# Patient Record
Sex: MALE | Race: WHITE | NOT HISPANIC OR LATINO | Employment: FULL TIME | URBAN - METROPOLITAN AREA
[De-identification: names, ages, dates, MRNs, and addresses within clinical notes are randomized per-mention and may not be internally consistent; named-entity substitution may affect disease eponyms.]

---

## 2023-08-04 ENCOUNTER — LAB REQUISITION (OUTPATIENT)
Dept: LAB | Facility: CLINIC | Age: 68
End: 2023-08-04
Payer: COMMERCIAL

## 2023-08-04 DIAGNOSIS — Z01.818 ENCOUNTER FOR OTHER PREPROCEDURAL EXAMINATION: ICD-10-CM

## 2023-08-04 LAB
ALBUMIN SERPL BCG-MCNC: 4.3 G/DL (ref 3.5–5.2)
ALP SERPL-CCNC: 100 U/L (ref 40–129)
ALT SERPL W P-5'-P-CCNC: 13 U/L (ref 0–70)
ANION GAP SERPL CALCULATED.3IONS-SCNC: 13 MMOL/L (ref 7–15)
AST SERPL W P-5'-P-CCNC: 15 U/L (ref 0–45)
BILIRUB SERPL-MCNC: 0.8 MG/DL
BUN SERPL-MCNC: 28.4 MG/DL (ref 8–23)
CALCIUM SERPL-MCNC: 9.3 MG/DL (ref 8.8–10.2)
CHLORIDE SERPL-SCNC: 101 MMOL/L (ref 98–107)
CREAT SERPL-MCNC: 1.32 MG/DL (ref 0.67–1.17)
DEPRECATED HCO3 PLAS-SCNC: 22 MMOL/L (ref 22–29)
GFR SERPL CREATININE-BSD FRML MDRD: 59 ML/MIN/1.73M2
GLUCOSE SERPL-MCNC: 161 MG/DL (ref 70–99)
POTASSIUM SERPL-SCNC: 4.6 MMOL/L (ref 3.4–5.3)
PROT SERPL-MCNC: 6.6 G/DL (ref 6.4–8.3)
SODIUM SERPL-SCNC: 136 MMOL/L (ref 136–145)

## 2023-08-04 PROCEDURE — 80053 COMPREHEN METABOLIC PANEL: CPT | Mod: ORL | Performed by: NURSE PRACTITIONER

## 2023-09-14 ENCOUNTER — ANCILLARY ORDERS (OUTPATIENT)
Dept: MRI IMAGING | Facility: HOSPITAL | Age: 68
End: 2023-09-14

## 2023-09-14 ENCOUNTER — LAB REQUISITION (OUTPATIENT)
Dept: LAB | Facility: CLINIC | Age: 68
End: 2023-09-14
Payer: COMMERCIAL

## 2023-09-14 DIAGNOSIS — E78.5 HYPERLIPIDEMIA, UNSPECIFIED: ICD-10-CM

## 2023-09-14 DIAGNOSIS — Z12.5 ENCOUNTER FOR SCREENING FOR MALIGNANT NEOPLASM OF PROSTATE: ICD-10-CM

## 2023-09-14 DIAGNOSIS — N28.9 LESION OF RIGHT NATIVE KIDNEY: ICD-10-CM

## 2023-09-14 LAB
CHOLEST SERPL-MCNC: 189 MG/DL
HDLC SERPL-MCNC: 31 MG/DL
LDLC SERPL CALC-MCNC: 91 MG/DL
NONHDLC SERPL-MCNC: 158 MG/DL
PSA SERPL DL<=0.01 NG/ML-MCNC: 2.93 NG/ML (ref 0–4.5)
TRIGL SERPL-MCNC: 335 MG/DL

## 2023-09-14 PROCEDURE — 80061 LIPID PANEL: CPT | Mod: ORL | Performed by: NURSE PRACTITIONER

## 2023-09-14 PROCEDURE — G0103 PSA SCREENING: HCPCS | Mod: ORL | Performed by: NURSE PRACTITIONER

## 2024-01-09 ENCOUNTER — MEDICAL CORRESPONDENCE (OUTPATIENT)
Dept: HEALTH INFORMATION MANAGEMENT | Facility: CLINIC | Age: 69
End: 2024-01-09
Payer: COMMERCIAL

## 2024-01-09 ENCOUNTER — LAB REQUISITION (OUTPATIENT)
Dept: LAB | Facility: CLINIC | Age: 69
End: 2024-01-09
Payer: COMMERCIAL

## 2024-01-09 DIAGNOSIS — I10 ESSENTIAL (PRIMARY) HYPERTENSION: ICD-10-CM

## 2024-01-09 DIAGNOSIS — E11.9 TYPE 2 DIABETES MELLITUS WITHOUT COMPLICATIONS (H): ICD-10-CM

## 2024-01-09 PROCEDURE — 82570 ASSAY OF URINE CREATININE: CPT | Mod: ORL | Performed by: NURSE PRACTITIONER

## 2024-01-09 PROCEDURE — 80048 BASIC METABOLIC PNL TOTAL CA: CPT | Mod: ORL | Performed by: NURSE PRACTITIONER

## 2024-01-10 ENCOUNTER — TRANSCRIBE ORDERS (OUTPATIENT)
Dept: OTHER | Age: 69
End: 2024-01-10

## 2024-01-10 DIAGNOSIS — I10 HYPERTENSION, UNSPECIFIED TYPE: Primary | ICD-10-CM

## 2024-01-10 LAB
ANION GAP SERPL CALCULATED.3IONS-SCNC: 13 MMOL/L (ref 7–15)
BUN SERPL-MCNC: 35.1 MG/DL (ref 8–23)
CALCIUM SERPL-MCNC: 9.8 MG/DL (ref 8.8–10.2)
CHLORIDE SERPL-SCNC: 102 MMOL/L (ref 98–107)
CREAT SERPL-MCNC: 1.33 MG/DL (ref 0.67–1.17)
CREAT UR-MCNC: 65.1 MG/DL
DEPRECATED HCO3 PLAS-SCNC: 26 MMOL/L (ref 22–29)
EGFRCR SERPLBLD CKD-EPI 2021: 58 ML/MIN/1.73M2
GLUCOSE SERPL-MCNC: 170 MG/DL (ref 70–99)
MICROALBUMIN UR-MCNC: <12 MG/L
MICROALBUMIN/CREAT UR: NORMAL MG/G{CREAT}
POTASSIUM SERPL-SCNC: 4.3 MMOL/L (ref 3.4–5.3)
SODIUM SERPL-SCNC: 141 MMOL/L (ref 135–145)

## 2024-08-08 ENCOUNTER — LAB REQUISITION (OUTPATIENT)
Dept: LAB | Facility: CLINIC | Age: 69
End: 2024-08-08
Payer: COMMERCIAL

## 2024-08-08 ENCOUNTER — TRANSFERRED RECORDS (OUTPATIENT)
Dept: HEALTH INFORMATION MANAGEMENT | Facility: CLINIC | Age: 69
End: 2024-08-08

## 2024-08-08 DIAGNOSIS — I10 ESSENTIAL (PRIMARY) HYPERTENSION: ICD-10-CM

## 2024-08-08 DIAGNOSIS — E78.5 HYPERLIPIDEMIA, UNSPECIFIED: ICD-10-CM

## 2024-08-08 DIAGNOSIS — Z12.5 ENCOUNTER FOR SCREENING FOR MALIGNANT NEOPLASM OF PROSTATE: ICD-10-CM

## 2024-08-08 LAB
ALBUMIN SERPL BCG-MCNC: 4.1 G/DL (ref 3.5–5.2)
ALP SERPL-CCNC: 102 U/L (ref 40–150)
ALT SERPL W P-5'-P-CCNC: 12 U/L (ref 0–70)
ANION GAP SERPL CALCULATED.3IONS-SCNC: 12 MMOL/L (ref 7–15)
AST SERPL W P-5'-P-CCNC: 13 U/L (ref 0–45)
BILIRUB SERPL-MCNC: 0.5 MG/DL
BUN SERPL-MCNC: 34.3 MG/DL (ref 8–23)
CALCIUM SERPL-MCNC: 9.6 MG/DL (ref 8.8–10.4)
CHLORIDE SERPL-SCNC: 101 MMOL/L (ref 98–107)
CHOLEST SERPL-MCNC: 185 MG/DL
CREAT SERPL-MCNC: 1.38 MG/DL (ref 0.67–1.17)
EGFRCR SERPLBLD CKD-EPI 2021: 55 ML/MIN/1.73M2
FASTING STATUS PATIENT QL REPORTED: ABNORMAL
FASTING STATUS PATIENT QL REPORTED: ABNORMAL
GLUCOSE SERPL-MCNC: 167 MG/DL (ref 70–99)
HCO3 SERPL-SCNC: 23 MMOL/L (ref 22–29)
HDLC SERPL-MCNC: 33 MG/DL
LDLC SERPL CALC-MCNC: 96 MG/DL
NONHDLC SERPL-MCNC: 152 MG/DL
POTASSIUM SERPL-SCNC: 4.5 MMOL/L (ref 3.4–5.3)
PROT SERPL-MCNC: 7 G/DL (ref 6.4–8.3)
PSA SERPL DL<=0.01 NG/ML-MCNC: 3.05 NG/ML (ref 0–4.5)
SODIUM SERPL-SCNC: 136 MMOL/L (ref 135–145)
TRIGL SERPL-MCNC: 282 MG/DL

## 2024-08-08 PROCEDURE — 80061 LIPID PANEL: CPT | Mod: ORL | Performed by: NURSE PRACTITIONER

## 2024-08-08 PROCEDURE — G0103 PSA SCREENING: HCPCS | Mod: ORL | Performed by: NURSE PRACTITIONER

## 2024-08-08 PROCEDURE — 80053 COMPREHEN METABOLIC PANEL: CPT | Mod: ORL | Performed by: NURSE PRACTITIONER

## 2024-08-21 ENCOUNTER — TRANSFERRED RECORDS (OUTPATIENT)
Dept: HEALTH INFORMATION MANAGEMENT | Facility: CLINIC | Age: 69
End: 2024-08-21
Payer: COMMERCIAL

## 2024-09-13 ENCOUNTER — MEDICAL CORRESPONDENCE (OUTPATIENT)
Dept: HEALTH INFORMATION MANAGEMENT | Facility: CLINIC | Age: 69
End: 2024-09-13
Payer: COMMERCIAL

## 2024-09-13 ENCOUNTER — TRANSCRIBE ORDERS (OUTPATIENT)
Dept: CALL CENTER | Age: 69
End: 2024-09-13
Payer: COMMERCIAL

## 2024-09-13 DIAGNOSIS — R91.1 LEFT UPPER LOBE PULMONARY NODULE: Primary | ICD-10-CM

## 2024-10-24 ENCOUNTER — OFFICE VISIT (OUTPATIENT)
Dept: CARDIOLOGY | Facility: CLINIC | Age: 69
End: 2024-10-24
Payer: COMMERCIAL

## 2024-10-24 ENCOUNTER — TELEPHONE (OUTPATIENT)
Dept: VASCULAR SURGERY | Facility: CLINIC | Age: 69
End: 2024-10-24

## 2024-10-24 ENCOUNTER — TELEPHONE (OUTPATIENT)
Dept: CARDIOLOGY | Facility: CLINIC | Age: 69
End: 2024-10-24

## 2024-10-24 VITALS
WEIGHT: 189 LBS | BODY MASS INDEX: 31.49 KG/M2 | HEART RATE: 89 BPM | HEIGHT: 65 IN | OXYGEN SATURATION: 94 % | DIASTOLIC BLOOD PRESSURE: 63 MMHG | TEMPERATURE: 98 F | RESPIRATION RATE: 16 BRPM | SYSTOLIC BLOOD PRESSURE: 126 MMHG

## 2024-10-24 DIAGNOSIS — R09.89 BILATERAL CAROTID BRUITS: ICD-10-CM

## 2024-10-24 DIAGNOSIS — Z98.890 POSTOPERATIVE STATE: ICD-10-CM

## 2024-10-24 DIAGNOSIS — I25.10 CORONARY ARTERY DISEASE INVOLVING NATIVE CORONARY ARTERY OF NATIVE HEART WITHOUT ANGINA PECTORIS: Primary | ICD-10-CM

## 2024-10-24 DIAGNOSIS — Z95.828 S/P AORTO-BIFEMORAL BYPASS SURGERY: Primary | ICD-10-CM

## 2024-10-24 DIAGNOSIS — R09.89 OTHER SPECIFIED SYMPTOMS AND SIGNS INVOLVING THE CIRCULATORY AND RESPIRATORY SYSTEMS: ICD-10-CM

## 2024-10-24 DIAGNOSIS — R91.1 LEFT UPPER LOBE PULMONARY NODULE: ICD-10-CM

## 2024-10-24 DIAGNOSIS — I71.20 THORACIC AORTIC ANEURYSM (TAA), UNSPECIFIED PART, UNSPECIFIED WHETHER RUPTURED (H): ICD-10-CM

## 2024-10-24 DIAGNOSIS — I71.20 THORACIC AORTIC ANEURYSM (H): ICD-10-CM

## 2024-10-24 PROCEDURE — G2211 COMPLEX E/M VISIT ADD ON: HCPCS | Performed by: INTERNAL MEDICINE

## 2024-10-24 PROCEDURE — 99204 OFFICE O/P NEW MOD 45 MIN: CPT | Performed by: INTERNAL MEDICINE

## 2024-10-24 RX ORDER — PREDNISONE 20 MG/1
TABLET ORAL
COMMUNITY
Start: 2024-10-22

## 2024-10-24 RX ORDER — SEMAGLUTIDE 0.68 MG/ML
INJECTION, SOLUTION SUBCUTANEOUS
COMMUNITY
Start: 2024-10-02

## 2024-10-24 RX ORDER — CHLORTHALIDONE 25 MG/1
TABLET ORAL
COMMUNITY

## 2024-10-24 RX ORDER — CARVEDILOL 25 MG/1
TABLET ORAL
COMMUNITY
Start: 2024-08-08

## 2024-10-24 RX ORDER — ATORVASTATIN CALCIUM 40 MG/1
40 TABLET, FILM COATED ORAL DAILY PRN
COMMUNITY
End: 2024-10-24

## 2024-10-24 RX ORDER — ALBUTEROL SULFATE 90 UG/1
INHALANT RESPIRATORY (INHALATION)
COMMUNITY
Start: 2024-10-22

## 2024-10-24 RX ORDER — BLOOD-GLUCOSE METER
EACH MISCELLANEOUS
COMMUNITY
Start: 2024-08-09

## 2024-10-24 RX ORDER — LISINOPRIL 40 MG/1
TABLET ORAL
COMMUNITY

## 2024-10-24 RX ORDER — MULTIVITAMIN,THER AND MINERALS
1 TABLET ORAL DAILY
COMMUNITY

## 2024-10-24 RX ORDER — ASPIRIN 81 MG/1
1 TABLET, COATED ORAL
COMMUNITY
Start: 2024-07-30

## 2024-10-24 RX ORDER — GLIPIZIDE 10 MG/1
TABLET ORAL
COMMUNITY

## 2024-10-24 RX ORDER — BENZONATATE 200 MG/1
CAPSULE ORAL
COMMUNITY
Start: 2024-05-07

## 2024-10-24 RX ORDER — AMLODIPINE BESYLATE 5 MG/1
TABLET ORAL
COMMUNITY

## 2024-10-24 RX ORDER — EMPAGLIFLOZIN 25 MG/1
1 TABLET, FILM COATED ORAL
COMMUNITY
Start: 2024-09-10

## 2024-10-24 RX ORDER — ATORVASTATIN CALCIUM 80 MG/1
80 TABLET, FILM COATED ORAL DAILY PRN
Qty: 90 TABLET | Refills: 3 | Status: SHIPPED | OUTPATIENT
Start: 2024-10-24

## 2024-10-24 RX ORDER — NICOTINE 21 MG/24HR
1 PATCH, TRANSDERMAL 24 HOURS TRANSDERMAL
COMMUNITY
Start: 2023-09-30

## 2024-10-24 NOTE — LETTER
10/24/2024    Physician No Ref-Primary  No address on file    RE: Yovani Adame       Dear Colleague,     I had the pleasure of seeing Yovani Adame in the [facility].      [facility]  [phone]          Assessment/Recommendations   Patient with known significant vascular disease.  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  He has enlargement of his ascending aorta on echocardiogram at 4.2 cm approximately 1 year ago.  He has a history of stents placed in his coronary arteries in 2004 and his symptoms were shortness of breath and the symptoms of not returned nor does he have chest pain of any type.  He therefore has diffuse vascular disease.  He does continue to smoke cigarettes although he quit for several years.    We will increase his our atorvastatin to 80 mg a day in hopes of getting his LDL cholesterol less than 70.  Will ask him to recheck his fasting lipid panel at his primary care office in 3 months.    He has bilateral carotid bruits and we will do a carotid ultrasound to evaluate for carotid plaque.    I will review his imaging studies to see if he needs further evaluation from a vascular surgeon or cardiovascular surgeon.  We can set up a schedule for follow-up studies as well.  After review, we will ask that he see our thoracic surgeons to evaluate the descending thoracic aortic aneurysm that has been repaired and the necessity and timing of follow-up imaging studies.      He takes an antiplatelet agent and his blood pressure is at goal.    The longitudinal plan of care for the diagnosis(es)/condition(s) as documented were addressed during this visit. Due to the added complexity in care, I will continue to support Yovani in the subsequent management and with ongoing continuity of care.        History of Present Illness/Subjective    Mr. Yovani Adame is  "a 69 year old male with known coronary artery disease with stents placed in  he believes at [facility].  He also has had aneurysms repaired as noted above.  He is unaware that he has any carotid artery disease.  He did have trauma to his carotid artery during his central line placement which required repair in the distant past.    Currently has some difficulty with coughing but he is being treated for bronchitis.  He denies unusual shortness of breath with activity and also denies orthopnea, paroxysmal nocturnal dyspnea, peripheral edema, syncope or near syncopal episodes.  He denies any chest pains.  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  He continues to smoke cigarettes.  He quit for several years but then restarted when his spouse .    He grew up in [place].       Physical Examination Review of Systems   /63 (BP Location: Left arm, Patient Position: Sitting, Cuff Size: Adult Regular)   Pulse 89   Temp 98  F (36.7  C) (Oral)   Resp 16   Ht 1.651 m (5' 5\")   Wt 85.7 kg (189 lb)   SpO2 94%   BMI 31.45 kg/m    Body mass index is 31.45 kg/m .  Wt Readings from Last 3 Encounters:   10/24/24 85.7 kg (189 lb)     General Appearance:   Alert, cooperative and in no acute distress.   ENT/Mouth: Pink/moist oral mucosa   EYES:  no scleral icterus, normal conjunctivae   Neck: JVP normal. No Hepatojugular reflux. Thyroid not visualized.   Chest/Lungs:   Lungs have diffuse scattered rhonchi.  Equal chest wall expansion.   Cardiovascular:   S1, S2 without murmur ,clicks or rubs. Brachial, radial pulses are intact and symetric.  Bilateral carotid bruits noted.  Posterior tibial pulses are not palpable.   Abdomen:  Nontender.  Large scar midline.   Extremities: No cyanosis, clubbing or edema   Skin: no xanthelasma, warm.    Neurologic: normal arm movement bilateral, no tremors     Psychiatric: Appropriate affect.      Encounter Vitals  BP: 126/63  Pulse: 89  Resp: " "16  Temp: 98  F (36.7  C)  Temp src: Oral  SpO2: 94 %  Weight: 85.7 kg (189 lb)  Height: 165.1 cm (5' 5\")                                           Medical History  Surgical History Family History Social History   No past medical history on file. No past surgical history on file. No family history on file. Social History     Socioeconomic History     Marital status:      Spouse name: Not on file     Number of children: Not on file     Years of education: Not on file     Highest education level: Not on file   Occupational History     Not on file   Tobacco Use     Smoking status: Every Day     Types: Cigarettes     Smokeless tobacco: Never   Vaping Use     Vaping status: Never Used   Substance and Sexual Activity     Alcohol use: Yes     Drug use: Never     Sexual activity: Not on file   Other Topics Concern     Not on file   Social History Narrative     Not on file     Social Drivers of Health     Financial Resource Strain: High Risk (12/30/2021)    Received from [employer]    Financial Resource Strain      Difficulty of Paying Living Expenses: Not on file      Difficulty of Paying Living Expenses: Not on file   Food Insecurity: Not on file   Transportation Needs: Not on file   Physical Activity: Inactive (3/16/2020)    Received from [employer]    Exercise Vital Sign      Days of Exercise per Week: 0 days      Minutes of Exercise per Session: 0 min   Stress: No Stress Concern Present (3/16/2020)    Received from [employer]    [employer] - Occupational Stress Questionnaire      Feeling of Stress : Only a little   Social Connections: Unknown (12/30/2021)    Received from [employer]    Social Connections      Frequency of Communication with Friends and Family: Not on file   Interpersonal Safety: Not At Risk (3/16/2020)    Received from [employer] [facility]    Humiliation, Afraid, Rape, and Kick questionnaire      Fear of Current or Ex-Partner: No      Emotionally Abused: No      Physically Abused: No      Sexually Abused: No   Housing Stability: Not on file          Medications  Allergies   Current Outpatient Medications   Medication Sig Dispense Refill     albuterol (PROAIR HFA/PROVENTIL HFA/VENTOLIN HFA) 108 (90 Base) MCG/ACT inhaler        amLODIPine (NORVASC) 5 MG tablet take 1 tablet by mouth every day for 90 days       amoxicillin-clavulanate (AUGMENTIN) 875-125 MG tablet        ASPIRIN LOW DOSE 81 MG EC tablet Take 1 tablet by mouth daily at 2 pm.       atorvastatin (LIPITOR) 80 MG tablet Take 1 tablet (80 mg) by mouth daily as needed. 90 tablet 3     benzonatate (TESSALON) 200 MG capsule take 1 capsule by mouth three times a day as needed       Blood Glucose Monitoring Suppl (ACCU-CHEK GUIDE ME) w/Device KIT TEST BLOOD SUGAR 3 TIMES DAILY E11.65       carvedilol (COREG) 25 MG tablet take 1 tablet by mouth twice a day with food       chlorthalidone (HYGROTON) 25 MG tablet TAKE 1 TABLET BY MOUTH EVERY DAY IN THE MORNING WITH FOOD FOR 90 DAYS       glipiZIDE (GLUCOTROL) 10 MG tablet 1 TABLET 30 MINUTES BEFORE MEALS ORALLY 2 TIMES A DAY       JARDIANCE 25 MG TABS tablet Take 1 tablet by mouth daily at 2 pm.       lisinopril (ZESTRIL) 40 MG tablet take 1 tablet by mouth every day for 90 days       Multiple Vitamins-Minerals (SUPER THERA MILAGRO M) TABS Take 1 tablet by mouth daily.       nicotine (NICODERM CQ) 14 MG/24HR 24 hr patch Place 1 patch onto the skin.       OZEMPIC, 0.25 OR 0.5 MG/DOSE, 2 MG/3ML pen 0.25MG SUBCUTANEOUS EVERY WEEK 30 DAYS       predniSONE (DELTASONE) 20 MG tablet       Allergies   Allergen Reactions     Metformin Diarrhea         Lab Results    Chemistry/lipid CBC Cardiac Enzymes/BNP/TSH/INR   Lab Results   Component Value Date    CHOL 185 08/08/2024    HDL 33 (L) 08/08/2024    TRIG 282 (H) 08/08/2024    BUN  "34.3 (H) 08/08/2024     08/08/2024    CO2 23 08/08/2024    No results found for: \"WBC\", \"HGB\", \"HCT\", \"MCV\", \"PLT\" No results found for: \"CKTOTAL\", \"CKMB\", \"TROPONINI\", \"BNP\", \"TSH\", \"INR\"                                             Thank you for allowing me to participate in the care of your patient.      Sincerely,     Travis Ngo MD     [facility]  cc:   Freedom Albright MD  [id number] [place] [address]      "

## 2024-10-24 NOTE — PATIENT INSTRUCTIONS
[facility]  [phone]    Yovani Adame,    It was a pleasure to see you today at the [facility].     My recommendations after this visit include:    1.  We will increase your atorvastatin to 80 mg a day.  Would please check your lipid panel with your primary care doctor in 3 months.  Our goal is to get your LDL cholesterol less than 70.    2.  Will schedule a carotid ultrasound to evaluate for plaque in your carotid arteries.  We will call you with the results.    3.  I will reviewed the imaging studies related to the aneurysms that you have had repaired in the past.  We will set up a plan for follow-up of the studies and get back to you as well.        Travis Ngo

## 2024-10-24 NOTE — TELEPHONE ENCOUNTER
Spoke with pt regarding recommendations. Pt in agreement. Consult scheduled.    ---- Message from Travis Ngo sent at 10/24/2024 10:59 AM CDT -----  [place]    Could you let the patient know that I did put a consult in for thoracic surgery to see him and recommend frequency of future imaging or necessity of future imaging for his aneurysm in the chest.    Thanks,    Travis

## 2024-10-24 NOTE — TELEPHONE ENCOUNTER
The pt is scheduled on 11/7 at the [facility] for imaging and with .  Shaquille Tanner on 10/24/2024 at 5:58 PM    The pt was on the phone during scheduling of the above appointments and agreed to the dates times and locations of the appointments.

## 2024-10-24 NOTE — TELEPHONE ENCOUNTER
Patient Contacted for the patient to call back and schedule the following:Establish vascular care s/p previous thoracic aortic repair and aortobifem bypass done at [facility]     Location: [facility]  Provider: Dr. Eulalio Rg  Appointment type: New Vascular Patient  Appointment mode: In Person  Return date: Next Available       Specialty phone number: [phone] or [phone]    Referred to [facility]: No    Is Imaging Needed: Yes, CTA and Yes, US    Additional Notes: The pt request to be called back today at 5:30 pm    -Shaquille Tanner on 10/24/2024 at 4:32 PM

## 2024-10-24 NOTE — PROGRESS NOTES
[facility]  [phone]          Assessment/Recommendations   Patient with known significant vascular disease.  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  He has enlargement of his ascending aorta on echocardiogram at 4.2 cm approximately 1 year ago.  He has a history of stents placed in his coronary arteries in 2004 and his symptoms were shortness of breath and the symptoms of not returned nor does he have chest pain of any type.  He therefore has diffuse vascular disease.  He does continue to smoke cigarettes although he quit for several years.    We will increase his our atorvastatin to 80 mg a day in hopes of getting his LDL cholesterol less than 70.  Will ask him to recheck his fasting lipid panel at his primary care office in 3 months.    He has bilateral carotid bruits and we will do a carotid ultrasound to evaluate for carotid plaque.    I will review his imaging studies to see if he needs further evaluation from a vascular surgeon or cardiovascular surgeon.  We can set up a schedule for follow-up studies as well.  After review, we will ask that he see our thoracic surgeons to evaluate the descending thoracic aortic aneurysm that has been repaired and the necessity and timing of follow-up imaging studies.      He takes an antiplatelet agent and his blood pressure is at goal.    The longitudinal plan of care for the diagnosis(es)/condition(s) as documented were addressed during this visit. Due to the added complexity in care, I will continue to support Yovani in the subsequent management and with ongoing continuity of care.        History of Present Illness/Subjective    Mr. Yovani Adame is a 69 year old male with known coronary artery disease with stents placed in 2004 he believes at [facility].  He also has had aneurysms repaired as noted above.  He is unaware that he has  "any carotid artery disease.  He did have trauma to his carotid artery during his central line placement which required repair in the distant past.    Currently has some difficulty with coughing but he is being treated for bronchitis.  He denies unusual shortness of breath with activity and also denies orthopnea, paroxysmal nocturnal dyspnea, peripheral edema, syncope or near syncopal episodes.  He denies any chest pains.  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  He continues to smoke cigarettes.  He quit for several years but then restarted when his spouse .    He grew up in [place].       Physical Examination Review of Systems   /63 (BP Location: Left arm, Patient Position: Sitting, Cuff Size: Adult Regular)   Pulse 89   Temp 98  F (36.7  C) (Oral)   Resp 16   Ht 1.651 m (5' 5\")   Wt 85.7 kg (189 lb)   SpO2 94%   BMI 31.45 kg/m    Body mass index is 31.45 kg/m .  Wt Readings from Last 3 Encounters:   10/24/24 85.7 kg (189 lb)     General Appearance:   Alert, cooperative and in no acute distress.   ENT/Mouth: Pink/moist oral mucosa   EYES:  no scleral icterus, normal conjunctivae   Neck: JVP normal. No Hepatojugular reflux. Thyroid not visualized.   Chest/Lungs:   Lungs have diffuse scattered rhonchi.  Equal chest wall expansion.   Cardiovascular:   S1, S2 without murmur ,clicks or rubs. Brachial, radial pulses are intact and symetric.  Bilateral carotid bruits noted.  Posterior tibial pulses are not palpable.   Abdomen:  Nontender.  Large scar midline.   Extremities: No cyanosis, clubbing or edema   Skin: no xanthelasma, warm.    Neurologic: normal arm movement bilateral, no tremors     Psychiatric: Appropriate affect.      Encounter Vitals  BP: 126/63  Pulse: 89  Resp: 16  Temp: 98  F (36.7  C)  Temp src: Oral  SpO2: 94 %  Weight: 85.7 kg (189 lb)  Height: 165.1 cm (5' 5\")                                           Medical History  Surgical History Family " History Social History   No past medical history on file. No past surgical history on file. No family history on file. Social History     Socioeconomic History    Marital status:      Spouse name: Not on file    Number of children: Not on file    Years of education: Not on file    Highest education level: Not on file   Occupational History    Not on file   Tobacco Use    Smoking status: Every Day     Types: Cigarettes    Smokeless tobacco: Never   Vaping Use    Vaping status: Never Used   Substance and Sexual Activity    Alcohol use: Yes    Drug use: Never    Sexual activity: Not on file   Other Topics Concern    Not on file   Social History Narrative    Not on file     Social Drivers of Health     Financial Resource Strain: High Risk (12/30/2021)    Received from [employer] [employer] [facility] [employer] [facility]    Financial Resource Strain     Difficulty of Paying Living Expenses: Not on file     Difficulty of Paying Living Expenses: Not on file   Food Insecurity: Not on file   Transportation Needs: Not on file   Physical Activity: Inactive (3/16/2020)    Received from [employer] [facility]    Exercise Vital Sign     Days of Exercise per Week: 0 days     Minutes of Exercise per Session: 0 min   Stress: No Stress Concern Present (3/16/2020)    Received from [employer] [facility] [employer] [facility] - Occupational Stress Questionnaire     Feeling of Stress : Only a little   Social Connections: Unknown (12/30/2021)    Received from [employer] [facility]    Social Connections     Frequency of Communication with Friends and Family: Not on file   Interpersonal Safety: Not At Risk (3/16/2020)    [facility]    Humiliation, Afraid, Rape, and Kick questionnaire     Fear of Current or Ex-Partner: No     Emotionally Abused: No     Physically Abused: No     Sexually Abused: No   Housing  "Stability: Not on file          Medications  Allergies   Current Outpatient Medications   Medication Sig Dispense Refill    albuterol (PROAIR HFA/PROVENTIL HFA/VENTOLIN HFA) 108 (90 Base) MCG/ACT inhaler       amLODIPine (NORVASC) 5 MG tablet take 1 tablet by mouth every day for 90 days      amoxicillin-clavulanate (AUGMENTIN) 875-125 MG tablet       ASPIRIN LOW DOSE 81 MG EC tablet Take 1 tablet by mouth daily at 2 pm.      atorvastatin (LIPITOR) 80 MG tablet Take 1 tablet (80 mg) by mouth daily as needed. 90 tablet 3    benzonatate (TESSALON) 200 MG capsule take 1 capsule by mouth three times a day as needed      Blood Glucose Monitoring Suppl (ACCU-CHEK GUIDE ME) w/Device KIT TEST BLOOD SUGAR 3 TIMES DAILY E11.65      carvedilol (COREG) 25 MG tablet take 1 tablet by mouth twice a day with food      chlorthalidone (HYGROTON) 25 MG tablet TAKE 1 TABLET BY MOUTH EVERY DAY IN THE MORNING WITH FOOD FOR 90 DAYS      glipiZIDE (GLUCOTROL) 10 MG tablet 1 TABLET 30 MINUTES BEFORE MEALS ORALLY 2 TIMES A DAY      JARDIANCE 25 MG TABS tablet Take 1 tablet by mouth daily at 2 pm.      lisinopril (ZESTRIL) 40 MG tablet take 1 tablet by mouth every day for 90 days      Multiple Vitamins-Minerals (SUPER THERA MILAGRO M) TABS Take 1 tablet by mouth daily.      nicotine (NICODERM CQ) 14 MG/24HR 24 hr patch Place 1 patch onto the skin.      OZEMPIC, 0.25 OR 0.5 MG/DOSE, 2 MG/3ML pen 0.25MG SUBCUTANEOUS EVERY WEEK 30 DAYS      predniSONE (DELTASONE) 20 MG tablet       Allergies   Allergen Reactions    Metformin Diarrhea         Lab Results    Chemistry/lipid CBC Cardiac Enzymes/BNP/TSH/INR   Lab Results   Component Value Date    CHOL 185 08/08/2024    HDL 33 (L) 08/08/2024    TRIG 282 (H) 08/08/2024    BUN 34.3 (H) 08/08/2024     08/08/2024    CO2 23 08/08/2024    No results found for: \"WBC\", \"HGB\", \"HCT\", \"MCV\", \"PLT\" No results found for: \"CKTOTAL\", \"CKMB\", \"TROPONINI\", \"BNP\", \"TSH\", \"INR\"                                       "

## 2024-10-30 ENCOUNTER — HOSPITAL ENCOUNTER (OUTPATIENT)
Dept: ULTRASOUND IMAGING | Facility: HOSPITAL | Age: 69
Discharge: HOME | End: 2024-10-30
Attending: INTERNAL MEDICINE | Admitting: INTERNAL MEDICINE
Payer: COMMERCIAL

## 2024-10-30 DIAGNOSIS — R09.89 BILATERAL CAROTID BRUITS: ICD-10-CM

## 2024-10-30 DIAGNOSIS — R91.1 LEFT UPPER LOBE PULMONARY NODULE: ICD-10-CM

## 2024-10-30 DIAGNOSIS — I25.10 CORONARY ARTERY DISEASE INVOLVING NATIVE CORONARY ARTERY OF NATIVE HEART WITHOUT ANGINA PECTORIS: ICD-10-CM

## 2024-10-30 PROCEDURE — 93880 EXTRACRANIAL BILAT STUDY: CPT

## 2024-11-27 ENCOUNTER — ANCILLARY PROCEDURE (OUTPATIENT)
Dept: CT IMAGING | Facility: CLINIC | Age: 69
End: 2024-11-27
Attending: SURGERY
Payer: COMMERCIAL

## 2024-11-27 ENCOUNTER — OFFICE VISIT (OUTPATIENT)
Dept: VASCULAR SURGERY | Facility: CLINIC | Age: 69
End: 2024-11-27
Payer: COMMERCIAL

## 2024-11-27 ENCOUNTER — ANCILLARY PROCEDURE (OUTPATIENT)
Dept: ULTRASOUND IMAGING | Facility: CLINIC | Age: 69
End: 2024-11-27
Attending: SURGERY
Payer: COMMERCIAL

## 2024-11-27 VITALS
DIASTOLIC BLOOD PRESSURE: 74 MMHG | SYSTOLIC BLOOD PRESSURE: 128 MMHG | OXYGEN SATURATION: 95 % | HEART RATE: 72 BPM | BODY MASS INDEX: 32.22 KG/M2 | WEIGHT: 193.6 LBS

## 2024-11-27 DIAGNOSIS — I25.10 CORONARY ARTERY DISEASE INVOLVING NATIVE HEART WITHOUT ANGINA PECTORIS, UNSPECIFIED VESSEL OR LESION TYPE: ICD-10-CM

## 2024-11-27 DIAGNOSIS — Z95.828 S/P AORTO-BIFEMORAL BYPASS SURGERY: ICD-10-CM

## 2024-11-27 DIAGNOSIS — E11.9 TYPE 2 DIABETES MELLITUS WITHOUT COMPLICATION, WITHOUT LONG-TERM CURRENT USE OF INSULIN (H): ICD-10-CM

## 2024-11-27 DIAGNOSIS — I71.20 THORACIC AORTIC ANEURYSM (TAA), UNSPECIFIED PART, UNSPECIFIED WHETHER RUPTURED (H): ICD-10-CM

## 2024-11-27 DIAGNOSIS — R09.89 OTHER SPECIFIED SYMPTOMS AND SIGNS INVOLVING THE CIRCULATORY AND RESPIRATORY SYSTEMS: ICD-10-CM

## 2024-11-27 DIAGNOSIS — I71.20 THORACIC AORTIC ANEURYSM (H): ICD-10-CM

## 2024-11-27 DIAGNOSIS — Z98.890 POSTOPERATIVE STATE: ICD-10-CM

## 2024-11-27 DIAGNOSIS — I65.22 STENOSIS OF LEFT CAROTID ARTERY: Primary | ICD-10-CM

## 2024-11-27 DIAGNOSIS — E78.5 HYPERLIPIDEMIA, UNSPECIFIED HYPERLIPIDEMIA TYPE: ICD-10-CM

## 2024-11-27 DIAGNOSIS — I10 BENIGN ESSENTIAL HYPERTENSION: ICD-10-CM

## 2024-11-27 DIAGNOSIS — F17.200 CURRENT SMOKER: ICD-10-CM

## 2024-11-27 LAB
CREAT BLD-MCNC: 1.5 MG/DL (ref 0.7–1.3)
EGFRCR SERPLBLD CKD-EPI 2021: 50 ML/MIN/1.73M2

## 2024-11-27 PROCEDURE — 82565 ASSAY OF CREATININE: CPT | Performed by: PATHOLOGY

## 2024-11-27 PROCEDURE — 93922 UPR/L XTREMITY ART 2 LEVELS: CPT | Performed by: RADIOLOGY

## 2024-11-27 PROCEDURE — 71275 CT ANGIOGRAPHY CHEST: CPT | Mod: GC | Performed by: RADIOLOGY

## 2024-11-27 PROCEDURE — 99203 OFFICE O/P NEW LOW 30 MIN: CPT | Performed by: SURGERY

## 2024-11-27 PROCEDURE — 74174 CTA ABD&PLVS W/CONTRAST: CPT | Mod: GC | Performed by: RADIOLOGY

## 2024-11-27 RX ORDER — IOPAMIDOL 755 MG/ML
125 INJECTION, SOLUTION INTRAVASCULAR ONCE
Status: COMPLETED | OUTPATIENT
Start: 2024-11-27 | End: 2024-11-27

## 2024-11-27 RX ADMIN — IOPAMIDOL 125 ML: 755 INJECTION, SOLUTION INTRAVASCULAR at 11:15

## 2024-11-27 ASSESSMENT — PAIN SCALES - GENERAL: PAINLEVEL_OUTOF10: NO PAIN (0)

## 2024-11-27 NOTE — DISCHARGE INSTRUCTIONS

## 2024-11-27 NOTE — PROGRESS NOTES
Vascular & Endovascular Surgery Clinic Consultation   Vascular Surgeon: Eulalio Rg MD, RPVI       Location:  [facility]    Yovani Adame  Medical Record #:  [id number]  YOB: 1955  Age:  69 year old     Date of Service: 11/27/2024    Referring Provider: Travis Ngo.  Primary Care Provider: No Ref-Primary, Physician    Chief Complaint/Reason for Visit: Consultation on carotid disease and aneurysm repairs performed elsewhere    HPI:  Mr. Adame is a pleasant 69 year old male seen today with his son for vascular care.  He reports carotid injury due to line placement prior to AAA repair.  He does not recall when he had his TEVAR.  He has also had coronary stents and multiple hernia repairs.  He is fairly active and still works.  He denies overt claudication, stroke/TIA, focal neurologic deficit, upper or lower extremity weakness or sensory dysfunction, amaurosis fugax, aphasia, dysarthria, or facial droop. He has chronic back pain but no new back, chest, or abdominal pain.     CV risk factors include AAA, CAD s/p MI, Heart failure, HTN, HLD, DM, varicose veins, and is a current smoker.    Relevant surgical history:  - Aortic aneurysm resection and replacement with qjvej-at-bxeozny bypass (Dr. Kwok - 2014) reportedly with carotid injury the day prior by patient  - Endovascular repair of descending thoracic aortic aneurysm with 34x150 mm [place] cTAG via right femoral artery cutdown and left percutaneous femoral access (Dr. Carrillo and Dr. Sewell - 8/6/2021)  - Open repair of incarcerated incisional hernia with mesh, retro-rectus myofascial release (Dr. Mello -12/3/2021) Additional hernia repairs in 2017 and 2019    Review of Systems:    A 12 point ROS was reviewed and is negative except for symptoms noted in HPI.     Medical/Surgical History:  No past medical history on file.    No past surgical history on file.     Allergies:     Allergies    Allergen Reactions    Metformin Diarrhea        Medications:    Current Outpatient Medications   Medication Sig Dispense Refill    albuterol (PROAIR HFA/PROVENTIL HFA/VENTOLIN HFA) 108 (90 Base) MCG/ACT inhaler       amLODIPine (NORVASC) 5 MG tablet take 1 tablet by mouth every day for 90 days      amoxicillin-clavulanate (AUGMENTIN) 875-125 MG tablet       ASPIRIN LOW DOSE 81 MG EC tablet Take 1 tablet by mouth daily at 2 pm.      atorvastatin (LIPITOR) 80 MG tablet Take 1 tablet (80 mg) by mouth daily as needed. 90 tablet 3    benzonatate (TESSALON) 200 MG capsule take 1 capsule by mouth three times a day as needed      Blood Glucose Monitoring Suppl (ACCU-CHEK GUIDE ME) w/Device KIT TEST BLOOD SUGAR 3 TIMES DAILY E11.65      carvedilol (COREG) 25 MG tablet take 1 tablet by mouth twice a day with food      chlorthalidone (HYGROTON) 25 MG tablet TAKE 1 TABLET BY MOUTH EVERY DAY IN THE MORNING WITH FOOD FOR 90 DAYS      glipiZIDE (GLUCOTROL) 10 MG tablet 1 TABLET 30 MINUTES BEFORE MEALS ORALLY 2 TIMES A DAY      JARDIANCE 25 MG TABS tablet Take 1 tablet by mouth daily at 2 pm.      lisinopril (ZESTRIL) 40 MG tablet take 1 tablet by mouth every day for 90 days      Multiple Vitamins-Minerals (SUPER THERA MILAGRO M) TABS Take 1 tablet by mouth daily.      nicotine (NICODERM CQ) 14 MG/24HR 24 hr patch Place 1 patch onto the skin.      OZEMPIC, 0.25 OR 0.5 MG/DOSE, 2 MG/3ML pen 0.25MG SUBCUTANEOUS EVERY WEEK 30 DAYS      predniSONE (DELTASONE) 20 MG tablet        No current facility-administered medications for this visit.        Social Habits:    Tobacco Use      Smoking status: Every Day        Types: Cigarettes      Smokeless tobacco: Never       Alcohol Use: Not on file       History   Drug Use Unknown        Family History:  No family history on file.    Physical Examination:  There were no vitals taken for this visit.  Wt Readings from Last 1 Encounters:   10/24/24 85.7 kg (189 lb)     There is no height or weight  on file to calculate BMI.    Exam:  General: No apparent distress. Answers questions appropriately   Neurologic: Focally intact, Alert and oriented x 3.   Eyes: Grossly normal.   Cardiac:  RRR by peripheral pulse   Pulmonary:  Non labored breathing with normal respiratory effort  Abdominal: Soft, non distended, non tender to palpation.   Musculoskeletal/Extremity: 5/5 gross upper and lower extremity strength      Laboratory Findings:  Creatinine   Date Value Ref Range Status   08/08/2024 1.38 (H) 0.67 - 1.17 mg/dL Final     Creatinine POCT   Date Value Ref Range Status   11/27/2024 1.5 (H) 0.7 - 1.3 mg/dL Final     Sodium   Date Value Ref Range Status   08/08/2024 136 135 - 145 mmol/L Final     Glucose   Date Value Ref Range Status   08/08/2024 167 (H) 70 - 99 mg/dL Final   01/09/2024 170 (H) 70 - 99 mg/dL Final       Imaging:    I personally reviewed the CTA chest abdomen and pelvis as well as ABIs from today which shows excluded saccular thoracic aortic aneurysm measuring 51 mm in sagittal oblique.  No endoleak. Penetrating ulcers with shaggy atheromatous disease of distal arch just proximal to the stent. Widely patent zkqbv-oc-lctipae bypass with mild degeneration of distal anastomoses. Retrograde filling of left internal iliac artery. Carotid duplex shows <50% on the right, and is reported <50% on the left however, in personal interpretation there is actually significant common carotid and bulb disease that require further investigation.    Impression/Shared Medical Decision Making:    #1- Left common carotid stenosis by ultrasound  #2- Reported history of right carotid injury and repair  #3- Abdominal aortic aneurysm status post resection and namrj-kb-afzatkn replacement  #4- Thoracic aortic aneurysm status post TEVAR  #5- Coronary artery disease s/p MI,   #6- Heart failure  #7- Hypertension  #8- Hyperlipidemia  #9- Diabetes Mellitus  #10 Varicose veins  #11- Nicotine dependence, current smoker  Mr. Adame is  a pleasant 69 year old male evaluated for his vascular disease. He has both aneurysmal and occlusive disease. .    Discussed warning signs including, but not limited to, stroke/TIA, focal neurologic deficit, upper or lower extremity weakness or sensory dysfunction, amaurosis fugax, aphasia, dysarthria, or facial droop.  If he experiences any of these, he has been advised to seek treatment and contact my team.    Mr. Adame and his son are in agreement with this plan as outlined.    Recommendations/Plan:  Will start with a CTA head and neck to better understand his left carotid stenosis and surgical candidacy.  Follow up in 1 year for aneurysmal disease with repeat CTA chest abdomen and pelvis  Continue optimal medical therapy with   Aspirin  High does statin therapy  Smoking cessation  Antihypertensives as indicated    Eulalio Rg MD  Vascular & Endovascular Surgery      30 minutes spent on the day of encounter doing chart review from our system and care everywhere, history and exam, documentation, coordinating care, and further activities as noted with over half spent counseling.

## 2024-11-27 NOTE — NURSING NOTE
Chief Complaint   Patient presents with    New Patient     NEW VASCULAR PATIENT     Vitals were taken and medications reconciled.    Nghia Mack, EMT  1:50 PM

## 2024-11-27 NOTE — PATIENT INSTRUCTIONS
Thank you so much for choosing us for your care. It was a pleasure to see you at the vascular clinic today.     Follow-up recommendations: We will see you back once your CTA is done. Please do not hesitate to reach out to us in the meantime with any concerns. Our schedulers will get in touch with you to set up your follow-up visit.    Additional testing/imaging ordered today: CTA head/neck.      Our scheduling team will get in touch with you to set up any follow-up testing/imaging and/or appointments. Please be aware that any testing/imaging recommended today will need to completed prior to your next visit with the provider. If testing/imaging is not completed prior to your next visit, your visit may be rescheduled.     If you have any questions, please contact our clinic directly at [phone] and ask for the nurse. We also encourage the use of [employer] to communicate with your healthcare provider.    If you have an urgent need after business hours (8:00 am to 4:30 pm) please call [phone], option 4, and ask for the vascular attending on call. For non-urgent after hours needs, please call the vascular clinic at [phone]. For scheduling needs, please call our clinic directly at [phone].

## 2024-11-27 NOTE — LETTER
11/27/2024       RE: Yovani Adame  [address]  [address]  [place] [facility] [address]     Dear Colleague,    Thank you for referring your patient, Yovani Adame, to the [facility] [place] at [facility]. Please see a copy of my visit note below.    Vascular & Endovascular Surgery Clinic Consultation   Vascular Surgeon: Eulalio Rg MD, RPVI       Location:  [facility]    Yovani Adame  Medical Record #:  [id number]  YOB: 1955  Age:  69 year old     Date of Service: 11/27/2024    Referring Provider: Travis Ngo.  Primary Care Provider: No Ref-Primary, Physician    Chief Complaint/Reason for Visit: Consultation on carotid disease and aneurysm repairs performed elsewhere    HPI:  Mr. Adame is a pleasant 69 year old male seen today with his son for vascular care.  He reports carotid injury due to line placement prior to AAA repair.  He does not recall when he had his TEVAR.  He has also had coronary stents and multiple hernia repairs.  He is fairly active and still works.  He denies overt claudication, stroke/TIA, focal neurologic deficit, upper or lower extremity weakness or sensory dysfunction, amaurosis fugax, aphasia, dysarthria, or facial droop. He has chronic back pain but no new back, chest, or abdominal pain.     CV risk factors include AAA, CAD s/p MI, Heart failure, HTN, HLD, DM, varicose veins, and is a current smoker.    Relevant surgical history:  - Aortic aneurysm resection and replacement with gtzzu-tx-awramjy bypass (Dr. Kwok - 2014) reportedly with carotid injury the day prior by patient  - Endovascular repair of descending thoracic aortic aneurysm with 34x150 mm [place] cTAG via right femoral artery cutdown and left percutaneous femoral access (Dr. Carrillo and Dr. Sewell - 8/6/2021)  - Open repair of incarcerated incisional hernia with mesh, retro-rectus  myofascial release (Dr. Mello -12/3/2021) Additional hernia repairs in 2017 and 2019    Review of Systems:    A 12 point ROS was reviewed and is negative except for symptoms noted in HPI.     Medical/Surgical History:  No past medical history on file.    No past surgical history on file.     Allergies:     Allergies   Allergen Reactions     Metformin Diarrhea        Medications:    Current Outpatient Medications   Medication Sig Dispense Refill     albuterol (PROAIR HFA/PROVENTIL HFA/VENTOLIN HFA) 108 (90 Base) MCG/ACT inhaler        amLODIPine (NORVASC) 5 MG tablet take 1 tablet by mouth every day for 90 days       amoxicillin-clavulanate (AUGMENTIN) 875-125 MG tablet        ASPIRIN LOW DOSE 81 MG EC tablet Take 1 tablet by mouth daily at 2 pm.       atorvastatin (LIPITOR) 80 MG tablet Take 1 tablet (80 mg) by mouth daily as needed. 90 tablet 3     benzonatate (TESSALON) 200 MG capsule take 1 capsule by mouth three times a day as needed       Blood Glucose Monitoring Suppl (ACCU-CHEK GUIDE ME) w/Device KIT TEST BLOOD SUGAR 3 TIMES DAILY E11.65       carvedilol (COREG) 25 MG tablet take 1 tablet by mouth twice a day with food       chlorthalidone (HYGROTON) 25 MG tablet TAKE 1 TABLET BY MOUTH EVERY DAY IN THE MORNING WITH FOOD FOR 90 DAYS       glipiZIDE (GLUCOTROL) 10 MG tablet 1 TABLET 30 MINUTES BEFORE MEALS ORALLY 2 TIMES A DAY       JARDIANCE 25 MG TABS tablet Take 1 tablet by mouth daily at 2 pm.       lisinopril (ZESTRIL) 40 MG tablet take 1 tablet by mouth every day for 90 days       Multiple Vitamins-Minerals (SUPER THERA MILAGRO M) TABS Take 1 tablet by mouth daily.       nicotine (NICODERM CQ) 14 MG/24HR 24 hr patch Place 1 patch onto the skin.       OZEMPIC, 0.25 OR 0.5 MG/DOSE, 2 MG/3ML pen 0.25MG SUBCUTANEOUS EVERY WEEK 30 DAYS       predniSONE (DELTASONE) 20 MG tablet        No current facility-administered medications for this visit.        Social Habits:    Tobacco Use      Smoking status: Every Day         Types: Cigarettes      Smokeless tobacco: Never       Alcohol Use: Not on file       History   Drug Use Unknown        Family History:  No family history on file.    Physical Examination:  There were no vitals taken for this visit.  Wt Readings from Last 1 Encounters:   10/24/24 85.7 kg (189 lb)     There is no height or weight on file to calculate BMI.    Exam:  General: No apparent distress. Answers questions appropriately   Neurologic: Focally intact, Alert and oriented x 3.   Eyes: Grossly normal.   Cardiac:  RRR by peripheral pulse   Pulmonary:  Non labored breathing with normal respiratory effort  Abdominal: Soft, non distended, non tender to palpation.   Musculoskeletal/Extremity: 5/5 gross upper and lower extremity strength      Laboratory Findings:  Creatinine   Date Value Ref Range Status   08/08/2024 1.38 (H) 0.67 - 1.17 mg/dL Final     Creatinine POCT   Date Value Ref Range Status   11/27/2024 1.5 (H) 0.7 - 1.3 mg/dL Final     Sodium   Date Value Ref Range Status   08/08/2024 136 135 - 145 mmol/L Final     Glucose   Date Value Ref Range Status   08/08/2024 167 (H) 70 - 99 mg/dL Final   01/09/2024 170 (H) 70 - 99 mg/dL Final       Imaging:    I personally reviewed the CTA chest abdomen and pelvis as well as ABIs from today which shows excluded saccular thoracic aortic aneurysm measuring 51 mm in sagittal oblique.  No endoleak. Penetrating ulcers with shaggy atheromatous disease of distal arch just proximal to the stent. Widely patent urrbm-xo-vzdieif bypass with mild degeneration of distal anastomoses. Retrograde filling of left internal iliac artery. Carotid duplex shows <50% on the right, and is reported <50% on the left however, in personal interpretation there is actually significant common carotid and bulb disease that require further investigation.    Impression/Shared Medical Decision Making:    #1- Left common carotid stenosis by ultrasound  #2- Reported history of right carotid injury and  repair  #3- Abdominal aortic aneurysm status post resection and hcvvi-xb-azsqccd replacement  #4- Thoracic aortic aneurysm status post TEVAR  #5- Coronary artery disease s/p MI,   #6- Heart failure  #7- Hypertension  #8- Hyperlipidemia  #9- Diabetes Mellitus  #10 Varicose veins  #11- Nicotine dependence, current smoker  Mr. Adame is a pleasant 69 year old male evaluated for his vascular disease. He has both aneurysmal and occlusive disease. .    Discussed warning signs including, but not limited to, stroke/TIA, focal neurologic deficit, upper or lower extremity weakness or sensory dysfunction, amaurosis fugax, aphasia, dysarthria, or facial droop.  If he experiences any of these, he has been advised to seek treatment and contact my team.    Mr. Adame and his son are in agreement with this plan as outlined.    Recommendations/Plan:  Will start with a CTA head and neck to better understand his left carotid stenosis and surgical candidacy.  Follow up in 1 year for aneurysmal disease with repeat CTA chest abdomen and pelvis  Continue optimal medical therapy with   Aspirin  High does statin therapy  Smoking cessation  Antihypertensives as indicated    Eulalio Rg MD  Vascular & Endovascular Surgery      30 minutes spent on the day of encounter doing chart review from our system and care everywhere, history and exam, documentation, coordinating care, and further activities as noted with over half spent counseling.        Again, thank you for allowing me to participate in the care of your patient.      Sincerely,    Eulalio Rg MD

## 2024-12-04 ENCOUNTER — HOSPITAL ENCOUNTER (OUTPATIENT)
Dept: CT IMAGING | Facility: HOSPITAL | Age: 69
Discharge: HOME | End: 2024-12-04
Attending: SURGERY
Payer: COMMERCIAL

## 2024-12-04 DIAGNOSIS — I65.22 STENOSIS OF LEFT CAROTID ARTERY: ICD-10-CM

## 2024-12-04 PROCEDURE — 70496 CT ANGIOGRAPHY HEAD: CPT

## 2024-12-04 PROCEDURE — 250N000011 HC RX IP 250 OP 636: Performed by: SURGERY

## 2024-12-04 PROCEDURE — 250N000009 HC RX 250: Performed by: SURGERY

## 2024-12-04 RX ORDER — IOPAMIDOL 755 MG/ML
67 INJECTION, SOLUTION INTRAVASCULAR ONCE
Status: COMPLETED | OUTPATIENT
Start: 2024-12-04 | End: 2024-12-04

## 2024-12-04 RX ORDER — IOPAMIDOL 755 MG/ML
67 INJECTION, SOLUTION INTRAVASCULAR ONCE
Status: DISCONTINUED | OUTPATIENT
Start: 2024-12-04 | End: 2024-12-04

## 2024-12-04 RX ADMIN — IOPAMIDOL 67 ML: 755 INJECTION, SOLUTION INTRAVENOUS at 18:47

## 2024-12-04 RX ADMIN — SODIUM CHLORIDE 95 ML: 9 INJECTION, SOLUTION INTRAVENOUS at 18:48

## 2025-04-16 ENCOUNTER — LAB REQUISITION (OUTPATIENT)
Dept: LAB | Facility: CLINIC | Age: 70
End: 2025-04-16
Payer: COMMERCIAL

## 2025-04-16 DIAGNOSIS — N18.31 CHRONIC KIDNEY DISEASE, STAGE 3A (H): ICD-10-CM

## 2025-04-16 DIAGNOSIS — E78.5 HYPERLIPIDEMIA, UNSPECIFIED: ICD-10-CM

## 2025-04-16 LAB
ANION GAP SERPL CALCULATED.3IONS-SCNC: 12 MMOL/L (ref 7–15)
BUN SERPL-MCNC: 36 MG/DL (ref 8–23)
CALCIUM SERPL-MCNC: 9.5 MG/DL (ref 8.8–10.4)
CHLORIDE SERPL-SCNC: 97 MMOL/L (ref 98–107)
CHOLEST SERPL-MCNC: 166 MG/DL
CREAT SERPL-MCNC: 1.4 MG/DL (ref 0.67–1.17)
EGFRCR SERPLBLD CKD-EPI 2021: 54 ML/MIN/1.73M2
FASTING STATUS PATIENT QL REPORTED: NO
FASTING STATUS PATIENT QL REPORTED: NO
GLUCOSE SERPL-MCNC: 194 MG/DL (ref 70–99)
HCO3 SERPL-SCNC: 24 MMOL/L (ref 22–29)
HDLC SERPL-MCNC: 32 MG/DL
LDLC SERPL CALC-MCNC: 79 MG/DL
NONHDLC SERPL-MCNC: 134 MG/DL
POTASSIUM SERPL-SCNC: 4.1 MMOL/L (ref 3.4–5.3)
SODIUM SERPL-SCNC: 133 MMOL/L (ref 135–145)
TRIGL SERPL-MCNC: 275 MG/DL

## 2025-04-16 PROCEDURE — 80061 LIPID PANEL: CPT | Mod: ORL | Performed by: NURSE PRACTITIONER

## 2025-04-16 PROCEDURE — 80048 BASIC METABOLIC PNL TOTAL CA: CPT | Mod: ORL | Performed by: NURSE PRACTITIONER

## 2025-05-01 ENCOUNTER — OFFICE VISIT (OUTPATIENT)
Dept: PHARMACY | Facility: PHYSICIAN GROUP | Age: 70
End: 2025-05-01
Payer: COMMERCIAL

## 2025-05-01 DIAGNOSIS — M10.9 GOUT, UNSPECIFIED CAUSE, UNSPECIFIED CHRONICITY, UNSPECIFIED SITE: ICD-10-CM

## 2025-05-01 DIAGNOSIS — R05.3 CHRONIC COUGH: ICD-10-CM

## 2025-05-01 DIAGNOSIS — I10 ESSENTIAL HYPERTENSION: ICD-10-CM

## 2025-05-01 DIAGNOSIS — I25.10 CORONARY ARTERY DISEASE INVOLVING NATIVE CORONARY ARTERY OF NATIVE HEART WITHOUT ANGINA PECTORIS: ICD-10-CM

## 2025-05-01 DIAGNOSIS — E78.2 MIXED HYPERLIPIDEMIA: ICD-10-CM

## 2025-05-01 DIAGNOSIS — N18.31 STAGE 3A CHRONIC KIDNEY DISEASE (H): ICD-10-CM

## 2025-05-01 DIAGNOSIS — E11.65 TYPE 2 DIABETES MELLITUS WITH HYPERGLYCEMIA, WITHOUT LONG-TERM CURRENT USE OF INSULIN (H): Primary | ICD-10-CM

## 2025-05-01 DIAGNOSIS — E66.811 CLASS 1 OBESITY WITH SERIOUS COMORBIDITY AND BODY MASS INDEX (BMI) OF 32.0 TO 32.9 IN ADULT, UNSPECIFIED OBESITY TYPE: ICD-10-CM

## 2025-05-01 PROCEDURE — 99207 PR NO CHARGE LOS: CPT | Performed by: PHARMACIST

## 2025-05-01 PROCEDURE — 3079F DIAST BP 80-89 MM HG: CPT | Performed by: PHARMACIST

## 2025-05-01 PROCEDURE — 3075F SYST BP GE 130 - 139MM HG: CPT | Performed by: PHARMACIST

## 2025-05-01 NOTE — PATIENT INSTRUCTIONS
"Recommendations from today's MTM visit:                                                         - Start using 0.5 mg Ozempic once a week- wind pen up all the way until 0.5 mg is in the window.     - Finish current pen- either 1 or 2 weeks left. Then, use next pen and it will last 4 weeks.     - Keep the 1 mg pen (blue box)  in the refrigerator for now        Diet Tips:  - Eat SLOW- practice eating off of smaller plates/bowls, chewing to applesauce consistency, taking 20-30 minutes to eat in a calm/relaxed environment without distractions of tv/email/cell phone.  - Start with 1/2 of what you normally eat and see how you feel, if still hungry after 20 minutes go back for more  - Eat protein first. Include 15-20 gm protein at each meal, along with protein containing snack of 15-30 gm protein, to reach goal of 60-80 gm protein daily.  - Fill up on fiber. Fiber comes from plants- fruits, veggies, whole grains, nuts/seeds and beans. Aim for 25-35 grams per day by eating fiber with meals and snacks.  - Increase fluids. Drink 64 ounces of 0-calorie drinks between meals.   - Read food labels. Choose foods with less than 10 grams of sugar and 5 grams of fat per serving to prevent excess calories and weight re-gain.   - Try to have a vegetable or fruit with each meal daily.  - Practice plate method: 1/2 plate lean/low fat protein source, vegetable/fruit, less than 25% of plate complex carbohydrates (\"white stuff\"- potatoes, rice, pasta, bread, etc).  - Avoid snacking unless physically hungry.   - Incorporate daily physical activity, 30-60 minutes most days of the week    Follow-up: Thursday, June 5 at 10:30 AM     It was great speaking with you today.  I value your experience and would be very thankful for your time in providing feedback in our clinic survey. In the next few days, you may receive an email or text message from [employer] with a link to a survey related to your clinical pharmacist.    To schedule another MTM " appointment, please call [phone].    My Clinical Pharmacist's contact information:                                                      Please feel free to contact me with any questions or concerns you have.      Jazzy Horton, PharmD, [facility]  Medication Therapy Management Pharmacist  [facility]  [phone]

## 2025-05-01 NOTE — PROGRESS NOTES
Medication Therapy Management (MTM) Encounter    ASSESSMENT:                            Medication Adherence/Access: No issues identified.  _  Diabetes  A1c 8.7% (suboptimal).   Fasting blood sugar 130s-160s.  Taking subtherapeutic Ozempic dose (0.25mg weekly) for 2-3 years instead of 0.5mg due to not understanding pen. No hypoglycemia symptoms. Occasional transient finger numbness/tingling.  Patient would benefit from:  - Increase Ozempic to 0.5mg weekly  - Use remaining 0.25mg pen at 0.5mg dose (2 doses left)  - Then start new 0.5mg pen (4 doses per pen)  - Continue Jardiance 25mg daily, glipizide 10 mg twice daily  - Monitor for Ozempic side effects: nausea/vomiting, early satiety  - Check fasting blood sugar if lightheaded/dizzy  Educated on proper pen use/dosing and diet: protein first, fruits/veggies, then carbs; smaller plates; half portions initially  _  Hypertension  Coronary Artery Disease  Chronic Kidney Disease  Blood pressure is just above goal of <130/80.   Currently on multiple blood pressure agents. Reasonable to continue to monitor with potential weight loss benefit of titrating up Ozempic dose. If continues to be elevated at follow-up consider increasing amlodipine dose.   _  Hyperlipidemia  LDL not <70 mg/dL on maximum dose atorvastatin. Reasonable to focus on diet changes and weight loss with optimizing Ozempic dose. If remains above goal at next check consider adding ezetimibe.   _  Gout  Stable.   _  Chronic Cough and Tobacco Use  Lifelong cough, worsens 3x/year, may progress to bronchitis/pneumonia. PRN ICS/LABA inhaler for bronchitis. Current smoker (0.5 PPD), resumed 4 years ago after 17-year cessation.  Continue as needed inhaler for bronchitis  Encouraged smoking cessation    _____________________  PLAN:                            - Start using 0.5 mg Ozempic once a week- wind pen up all the way until 0.5 mg is in the window.     - Finish current pen- either 1 or 2 weeks left. Then, use  "next pen and it will last 4 weeks.     - Keep the 1 mg pen (blue box)  in the refrigerator for now    Diet Tips:  - Eat SLOW- practice eating off of smaller plates/bowls, chewing to applesauce consistency, taking 20-30 minutes to eat in a calm/relaxed environment without distractions of tv/email/cell phone.  - Start with 1/2 of what you normally eat and see how you feel, if still hungry after 20 minutes go back for more  - Eat protein first. Include 15-20 gm protein at each meal, along with protein containing snack of 15-30 gm protein, to reach goal of 60-80 gm protein daily.  - Fill up on fiber. Fiber comes from plants- fruits, veggies, whole grains, nuts/seeds and beans. Aim for 25-35 grams per day by eating fiber with meals and snacks.  - Increase fluids. Drink 64 ounces of 0-calorie drinks between meals.   - Read food labels. Choose foods with less than 10 grams of sugar and 5 grams of fat per serving to prevent excess calories and weight re-gain.   - Try to have a vegetable or fruit with each meal daily.  - Practice plate method: 1/2 plate lean/low fat protein source, vegetable/fruit, less than 25% of plate complex carbohydrates (\"white stuff\"- potatoes, rice, pasta, bread, etc).  - Avoid snacking unless physically hungry.   - Incorporate daily physical activity, 30-60 minutes most days of the week    Follow-up: Thursday, June 5 at 10:30 AM     SUBJECTIVE/OBJECTIVE:                          Yovani Adame is a 70 year old male seen for an initial visit. He was referred to me from Freedom Albright NP.      Reason for visit: Uncontrolled diabetes.    Allergies/ADRs: Reviewed in chart  Past Medical History: Reviewed in chart  Tobacco: He reports that he has been smoking cigarettes. 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 Some confusion about the dose of Ozempic he should be using- brings in pens today- see below.   No current issues with cost.     Diabetes  Weight Management  - Ozempic 0.25 mg weekly (prescribed 0.5 mg)   - Jardiance 25 mg 1 tablet once daily  - Glipizide 10 mg twice daily   Patient demonstrated Ozempic pen use and confirms he has been taking 0.25 mg for 2-3 years due to dosing confusion and never increased to full 0.5 mg dose on the pen. His provider recently sent in order for 1 mg pen that he brings today but has not started yet.   Last A1c: 8.7, prompting dose increase  BG checks 1-2x/week: 130s-160s  Previously took Ozempic only every 2 weeks due to cost with high deductible insurance plan. New insurance allows weekly dosing as prescribed  Never experienced any notable fullness or decreased appetite or other side effects with current Ozempic dose.   Occasional finger numbness/tingling, self-resolving  No episodes of low blood sugar.   Diet: Occasional dining out  Medication history: metformin- diarrhea    Hypertension  Coronary Artery Disease  Chronic Kidney Disease  - Chlorthalidone 25 mg once daily   - Carvedilol 25 mg twice daily  - Amlodipine 5 mg once daily   - Lisinopril 40 mg once daily   - Aspirin 81 mg daily   No side effects reported.   Does not check blood pressure at home.   History of CAD (2 stents in 2004)  3 aortic aneurysms  Brief cardiac arrest during past surgery    Hyperlipidemia  - Atorvastatin 80 mg once daily  No issues reported.     Gout  - Allopurinol 300 mg once daily   No recent attacks reported since on prevention medication.     Respiratory  - Breo Inhaler as needed for bronchitis  No recent fill or use of albuterol inhaler  Chronic cough since childhood, exacerbates 3x/year  History of bronchitis and pneumonia  Current smoker (0.5  "pack/day), resumed 4 years ago after 17-year cessation after wife passed. Not interested in quitting right now but thinks he will eventually.     Today's Vitals: /84 (BP Location: Right arm, Patient Position: Sitting, Cuff Size: Adult Large)   Ht 5' 5\" (1.651 m)   Wt 193 lb (87.5 kg)   BMI 32.12 kg/m    ----------------      I spent 47 minutes with this patient today. All changes were made via collaborative practice agreement with Freedom Albright NP.     A summary of these recommendations was given to the patient.    Jazzy Horton, PharmD, BCACP  Medication Therapy Management Pharmacist  [facility]  [phone]       Medication Therapy Recommendations  Type 2 diabetes mellitus with hyperglycemia, without long-term current use of insulin (H)   1 Current Medication: OZEMPIC, 0.25 OR 0.5 MG/DOSE, 2 MG/3ML pen   Current Medication Sig: Inject 0.5 mg subcutaneously every 7 days.   Rationale: Does not understand instructions - Adherence - Adherence   Recommendation: Provide Education   Status: Patient Agreed - Adherence/Education   Identified Date: 5/1/2025 Completed Date: 5/1/2025            "

## 2025-05-05 VITALS
SYSTOLIC BLOOD PRESSURE: 130 MMHG | HEIGHT: 65 IN | DIASTOLIC BLOOD PRESSURE: 84 MMHG | WEIGHT: 193 LBS | BODY MASS INDEX: 32.15 KG/M2

## 2025-05-05 RX ORDER — FLUTICASONE FUROATE AND VILANTEROL 100; 25 UG/1; UG/1
1 POWDER RESPIRATORY (INHALATION) DAILY PRN
COMMUNITY

## 2025-05-05 RX ORDER — ALLOPURINOL 300 MG/1
300 TABLET ORAL DAILY
COMMUNITY

## 2025-06-05 ENCOUNTER — OFFICE VISIT (OUTPATIENT)
Dept: PHARMACY | Facility: PHYSICIAN GROUP | Age: 70
End: 2025-06-05
Payer: COMMERCIAL

## 2025-06-05 VITALS
HEIGHT: 65 IN | SYSTOLIC BLOOD PRESSURE: 110 MMHG | WEIGHT: 187.6 LBS | BODY MASS INDEX: 31.25 KG/M2 | DIASTOLIC BLOOD PRESSURE: 67 MMHG

## 2025-06-05 DIAGNOSIS — E11.65 TYPE 2 DIABETES MELLITUS WITH HYPERGLYCEMIA, WITHOUT LONG-TERM CURRENT USE OF INSULIN (H): Primary | ICD-10-CM

## 2025-06-05 DIAGNOSIS — E66.811 CLASS 1 OBESITY WITH SERIOUS COMORBIDITY AND BODY MASS INDEX (BMI) OF 32.0 TO 32.9 IN ADULT, UNSPECIFIED OBESITY TYPE: ICD-10-CM

## 2025-06-05 PROCEDURE — 99207 PR NO CHARGE LOS: CPT | Performed by: PHARMACIST

## 2025-06-05 PROCEDURE — 3078F DIAST BP <80 MM HG: CPT | Performed by: PHARMACIST

## 2025-06-05 PROCEDURE — 3074F SYST BP LT 130 MM HG: CPT | Performed by: PHARMACIST

## 2025-06-05 NOTE — PROGRESS NOTES
Medication Therapy Management (MTM) Encounter    ASSESSMENT:                            Medication Adherence/Access:   No issues identified. Encouraged patient to take Ozempic off auto-refill at pharmacy.   _  Diabetes  Weight Management  A1c not at goal of <8% (higher goal based on age and ASCVD)  Blood glucose readings range from 138-168 mg/dL with occasional home monitoring.  Tolerating Ozempic dose increase to full 0.5 mg weekly dose well since last visit.  Weight loss since dose increase: 5 lbs (193 to 187 lbs).  Inconsistent BG monitoring due to finger prick discomfort.  Patient would benefit from:  - Continue Ozempic 0.5 mg SC weekly  - Educated on use of 1 mg Ozempic pen (36 clicks) for 0.5 mg dose  - Continue glipizide, Jardiance  - Encourage protein-rich breakfast  - Recommend sample Antwan 3 CGM for 2 weeks to assess glucose control at next visit  - Consider Ozempic increase to 1 mg weekly based on CGM results  _____________________  PLAN:                            - Continue Ozempic 0.5 mg once weekly  - Use remaining 0.5 mg Ozempic pens first  - Then can use 1 mg Ozempic pens to get 0.5 mg dose:    - Turn pen 36 clicks for 0.5 mg dose    - [place] 36 clicks on pen for future reference  - Change needle on Ozempic pen before each use, keep extra needles   - Continue glipizide twice daily and Jardiance every morning      Follow-up:  - Appointment on Friday, June 13 at 3:00 PM  - [facility] [place] [facility]    SUBJECTIVE/OBJECTIVE:                          Yovani Adame is a 70 year old male seen for a follow-up visit.       Reason for visit: Diabetes follow-up.    Allergies/ADRs: Reviewed in chart  Past Medical History: Reviewed in chart  Tobacco: He reports that he has been smoking cigarettes. He has never used smokeless tobacco.Nicotine/Tobacco Cessation Plan  Self help information given to patient  Alcohol: not currently using    Medication Adherence/Access: no issues  "reported.    Diabetes  Weight Management  - Ozempic 0.5 mg SC weekly (Saturdays)  - Jardiance 25 mg 1 tablet once daily  - Glipizide 10 mg twice daily   Ozempic increased from 0.25 mg 1 month ago. One instance of mild, transient stomach discomfort but not occurred again.   He has some confusion because he recently picked up a 1 mg Ozempic pen that the pharmacy refilled but he thought he was supposed to get 0.5 mg pen.   Recent Blood Sugar: 138-168 in past 2 weeks  Patient checks infrequently due to time constraints and discomfort  Weight loss: 5 lbs (193 to 187 lbs)  Diet: Often eats on-the-go; large dinners; baked fish, chicken wings, fried rice weekly  Insufficient fruit and vegetable intake  Exercise: Limited due to long workdays  Reports eating less, feels full faster  Difficulty cooking after work, often grabs food on way home    Today's Vitals: /67 (BP Location: Left arm, Patient Position: Sitting, Cuff Size: Adult Regular)   Ht 5' 5\" (1.651 m)   Wt 187 lb 9.6 oz (85.1 kg)   BMI 31.22 kg/m    ----------------      I spent 46 minutes with this patient today. All changes were made via collaborative practice agreement with Freedom Albright NP.     A summary of these recommendations was given to the patient.    Jazzy Horton, PharmD, BCACP  Medication Therapy Management Pharmacist  [facility]  [phone]           Medication Therapy Recommendations  No medication therapy recommendations to display   "

## 2025-06-13 ENCOUNTER — OFFICE VISIT (OUTPATIENT)
Dept: PHARMACY | Facility: PHYSICIAN GROUP | Age: 70
End: 2025-06-13
Payer: COMMERCIAL

## 2025-06-13 DIAGNOSIS — E66.811 CLASS 1 OBESITY WITH SERIOUS COMORBIDITY AND BODY MASS INDEX (BMI) OF 32.0 TO 32.9 IN ADULT, UNSPECIFIED OBESITY TYPE: ICD-10-CM

## 2025-06-13 DIAGNOSIS — E11.65 TYPE 2 DIABETES MELLITUS WITH HYPERGLYCEMIA, WITHOUT LONG-TERM CURRENT USE OF INSULIN (H): Primary | ICD-10-CM

## 2025-06-13 PROCEDURE — 99207 PR NO CHARGE LOS: CPT | Performed by: PHARMACIST

## 2025-06-13 NOTE — PROGRESS NOTES
Medication Therapy Management (MTM) Encounter    ASSESSMENT:                            Medication Adherence/Access: No issues identified.  _  Diabetes  Weight Management  A1c not at goal of <8% (higher goal based on age and ASCVD)  Not checking blood sugar regularly. Attempt at 2 week CGM trial unsuccessful due to not knowing passwords to allow download of [employer] buddy on phone.   Weight slightly decreased from 193 lbs to 187 lbs.  Current regimen: semaglutide 0.5 mg weekly (increased from 0.25 mg in early May), max glipizide and max Jardiance doses  Difficult to assess full impact of Ozempic dose increase without home BG readings.   Patient reports feeling full faster.  Patient prefers to avoid insulin due to occupation as .  Patient would benefit from:  - Continue semaglutide 0.5 mg weekly until next A1c check  - Continue current glipizide and Jardiance doses  - Dietary modifications: avoid donuts/cookies; increased protein  - A1C test on July 25th at 3:00 PM  - Follow up post-A1C to reassess treatment and potential Ozempic dose adjustment  _____________________  PLAN:                            - Continue taking Ozempic 0.5 mg once weekly  - Continue Jardiance 25 mg once daily  - Continue glipizide 10 mg twice daily     Follow-up:  - A1C test and MTM follow-up scheduled for July 25th at 3:00 PM    SUBJECTIVE/OBJECTIVE:                          Yovani Adame is a 70 year old male seen for a follow-up visit.       Reason for visit: Diabetes follow-up.    Allergies/ADRs: Reviewed in chart  Past Medical History: Reviewed in chart  Tobacco: He reports that he has been smoking cigarettes. He has never used smokeless tobacco.Nicotine/Tobacco Cessation Plan  Self help information given to patient Resumed after wife passed after previously quitting.   Alcohol: not currently using  Occupation:     Medication Adherence/Access: no issues reported.    Diabetes  Weight Management  - Ozempic 0.5 mg  once weekly (Saturdays)  - Jardiance 25 mg 1 tablet once daily  - Glipizide 10 mg twice daily   Started current 0.5 mg dose 2 months ago, adherent to new dose. Previously had only used 0.25 mg dose because he didn't understand direction.   Reports feeling garcia faster and possible weight loss  Current weight 187 lbs, down from 193 lbs  No issues or side effects with current dose  Desires to avoid increasing to 1 mg dose if not needed due to side effect concerns  Does not check blood sugar due to work nature  Interested in CGM trial but encountered setup difficulties during visit due to phone not being setup to download apps and he does not know his password  Denies any symptoms of low blood sugar  Diet: Trying to improve, occasionally has cookies. Eats banana or string cheese for breakfast  Exercise: Limited due to long workdays, Works with hands all day  Medication history: metformin- diarrhea  Last A1c: 8.7%    Today's Vitals: There were no vitals taken for this visit.  ----------------      I spent 47 minutes with this patient today. All changes were made via collaborative practice agreement with Freedom Albright NP.     A summary of these recommendations was given to the patient.    Jazzy Horton, PharmD, BCACP  Medication Therapy Management Pharmacist  [facility]  [phone]           Medication Therapy Recommendations  No medication therapy recommendations to display

## 2025-06-18 NOTE — PATIENT INSTRUCTIONS
Recommendations from today's MTM visit:                                                       - Continue taking Ozempic 0.5 mg once weekly  - Continue Jardiance 25 mg once daily  - Continue glipizide 10 mg twice daily     Follow-up:  - A1C test and MTM follow-up scheduled for July 25th at 3:00 PM    It was great speaking with you today.  I value your experience and would be very thankful for your time in providing feedback in our clinic survey. In the next few days, you may receive an email or text message from [employer] with a link to a survey related to your clinical pharmacist.    To schedule another MTM appointment, please call [phone].    My Clinical Pharmacist's contact information:                                                      Please feel free to contact me with any questions or concerns you have.      Jazzy Horton, PharmD, [facility]  Medication Therapy Management Pharmacist  [facility] [place]  [phone]

## 2025-08-13 ENCOUNTER — OFFICE VISIT (OUTPATIENT)
Dept: PHARMACY | Facility: PHYSICIAN GROUP | Age: 70
End: 2025-08-13
Payer: COMMERCIAL

## 2025-08-13 ENCOUNTER — TRANSFERRED RECORDS (OUTPATIENT)
Dept: HEALTH INFORMATION MANAGEMENT | Facility: CLINIC | Age: 70
End: 2025-08-13
Payer: COMMERCIAL

## 2025-08-13 VITALS
BODY MASS INDEX: 30.49 KG/M2 | WEIGHT: 183 LBS | DIASTOLIC BLOOD PRESSURE: 71 MMHG | HEIGHT: 65 IN | SYSTOLIC BLOOD PRESSURE: 112 MMHG

## 2025-08-13 DIAGNOSIS — R05.3 CHRONIC COUGH: ICD-10-CM

## 2025-08-13 DIAGNOSIS — I10 ESSENTIAL HYPERTENSION: ICD-10-CM

## 2025-08-13 DIAGNOSIS — E66.811 CLASS 1 OBESITY WITH SERIOUS COMORBIDITY AND BODY MASS INDEX (BMI) OF 32.0 TO 32.9 IN ADULT, UNSPECIFIED OBESITY TYPE: ICD-10-CM

## 2025-08-13 DIAGNOSIS — I25.10 CORONARY ARTERY DISEASE INVOLVING NATIVE CORONARY ARTERY OF NATIVE HEART WITHOUT ANGINA PECTORIS: ICD-10-CM

## 2025-08-13 DIAGNOSIS — E11.65 TYPE 2 DIABETES MELLITUS WITH HYPERGLYCEMIA, WITHOUT LONG-TERM CURRENT USE OF INSULIN (H): Primary | ICD-10-CM

## 2025-08-13 LAB — HBA1C MFR BLD: 6.9 % (ref 4.2–6.1)

## 2025-08-13 PROCEDURE — 3074F SYST BP LT 130 MM HG: CPT | Performed by: PHARMACIST

## 2025-08-13 PROCEDURE — 99207 PR NO CHARGE LOS: CPT | Performed by: PHARMACIST

## 2025-08-13 PROCEDURE — 3078F DIAST BP <80 MM HG: CPT | Performed by: PHARMACIST
